# Patient Record
Sex: FEMALE | Race: WHITE | NOT HISPANIC OR LATINO | Employment: OTHER | URBAN - METROPOLITAN AREA
[De-identification: names, ages, dates, MRNs, and addresses within clinical notes are randomized per-mention and may not be internally consistent; named-entity substitution may affect disease eponyms.]

---

## 2020-03-03 ENCOUNTER — NEW PATIENT COMPREHENSIVE (OUTPATIENT)
Dept: URBAN - METROPOLITAN AREA CLINIC 36 | Facility: CLINIC | Age: 80
End: 2020-03-03

## 2020-03-03 DIAGNOSIS — H25.811: ICD-10-CM

## 2020-03-03 DIAGNOSIS — H25.812: ICD-10-CM

## 2020-03-03 DIAGNOSIS — H35.361: ICD-10-CM

## 2020-03-03 DIAGNOSIS — H40.013: ICD-10-CM

## 2020-03-03 PROCEDURE — 92015 DETERMINE REFRACTIVE STATE: CPT

## 2020-03-03 PROCEDURE — 92004 COMPRE OPH EXAM NEW PT 1/>: CPT

## 2020-03-03 ASSESSMENT — VISUAL ACUITY
OD_CC: J2
OD_SC: 20/400
OS_SC: CF 6FT
OD_SC: J12-1
OD_CC: 20/60-2
OS_CC: 20/25
OD_PH: 20/40
OS_CC: J2

## 2020-03-03 ASSESSMENT — TONOMETRY
OS_IOP_MMHG: 19
OD_IOP_MMHG: 19

## 2020-07-22 ENCOUNTER — EST. PATIENT EMERGENCY (OUTPATIENT)
Dept: URBAN - METROPOLITAN AREA CLINIC 39 | Facility: CLINIC | Age: 80
End: 2020-07-22

## 2020-07-22 DIAGNOSIS — H00.012: ICD-10-CM

## 2020-07-22 DIAGNOSIS — H25.812: ICD-10-CM

## 2020-07-22 DIAGNOSIS — H40.013: ICD-10-CM

## 2020-07-22 DIAGNOSIS — H35.363: ICD-10-CM

## 2020-07-22 DIAGNOSIS — H35.3131: ICD-10-CM

## 2020-07-22 DIAGNOSIS — H25.811: ICD-10-CM

## 2020-07-22 DIAGNOSIS — H43.813: ICD-10-CM

## 2020-07-22 PROCEDURE — 92134 CPTRZ OPH DX IMG PST SGM RTA: CPT

## 2020-07-22 PROCEDURE — 92014 COMPRE OPH EXAM EST PT 1/>: CPT

## 2020-07-22 ASSESSMENT — VISUAL ACUITY
OD_CC: 20/70-1
OS_CC: 20/60+2
OS_BAT: 20/200
OD_BAT: 20/200

## 2020-07-22 ASSESSMENT — TONOMETRY
OD_IOP_MMHG: 17
OS_IOP_MMHG: 17

## 2020-08-04 ENCOUNTER — FOLLOW UP (OUTPATIENT)
Dept: URBAN - METROPOLITAN AREA CLINIC 39 | Facility: CLINIC | Age: 80
End: 2020-08-04

## 2020-08-04 DIAGNOSIS — H00.012: ICD-10-CM

## 2020-08-04 DIAGNOSIS — H40.013: ICD-10-CM

## 2020-08-04 DIAGNOSIS — H25.811: ICD-10-CM

## 2020-08-04 DIAGNOSIS — H25.812: ICD-10-CM

## 2020-08-04 PROCEDURE — 92012 INTRM OPH EXAM EST PATIENT: CPT

## 2020-08-04 PROCEDURE — 92083 EXTENDED VISUAL FIELD XM: CPT

## 2020-08-04 ASSESSMENT — VISUAL ACUITY
OD_CC: 20/80
OS_BAT: 20/200
OS_CC: 20/60
OD_BAT: 20/200

## 2020-08-04 ASSESSMENT — TONOMETRY
OD_IOP_MMHG: 14
OS_IOP_MMHG: 14

## 2020-08-11 ENCOUNTER — CATARACT CONSULT (OUTPATIENT)
Dept: URBAN - METROPOLITAN AREA CLINIC 39 | Facility: CLINIC | Age: 80
End: 2020-08-11

## 2020-08-11 DIAGNOSIS — H25.811: ICD-10-CM

## 2020-08-11 DIAGNOSIS — H25.812: ICD-10-CM

## 2020-08-11 DIAGNOSIS — H43.813: ICD-10-CM

## 2020-08-11 DIAGNOSIS — H40.013: ICD-10-CM

## 2020-08-11 DIAGNOSIS — H35.363: ICD-10-CM

## 2020-08-11 DIAGNOSIS — H35.3131: ICD-10-CM

## 2020-08-11 PROCEDURE — 92025-1 CORNEAL TOPOGRAPHY, INS

## 2020-08-11 PROCEDURE — 92286 ANT SGM IMG I&R SPECLR MIC: CPT

## 2020-08-11 PROCEDURE — 92136TC INTERFEROMETRY - TECHNICAL COMPONENT

## 2020-08-11 PROCEDURE — V2799PMN IMPRIMIS PRED-MOXI-NEPAF 5ML

## 2020-08-11 PROCEDURE — 92250 FUNDUS PHOTOGRAPHY W/I&R: CPT

## 2020-08-11 PROCEDURE — 92014 COMPRE OPH EXAM EST PT 1/>: CPT

## 2020-08-11 ASSESSMENT — TONOMETRY
OS_IOP_MMHG: 13
OD_IOP_MMHG: 14

## 2020-08-11 ASSESSMENT — VISUAL ACUITY
OS_CC: J3
OS_SC: 20/400
OS_BAT: 20/200
OD_SC: >J12
OD_BAT: 20/200
OD_SC: 20/200
OD_CC: J1
OD_CC: 20/70-1
OS_CC: 20/60-2
OS_SC: >J12

## 2020-08-24 ENCOUNTER — ESTABLISHED PATIENT (OUTPATIENT)
Dept: URBAN - METROPOLITAN AREA CLINIC 44 | Facility: CLINIC | Age: 80
End: 2020-08-24

## 2020-08-24 DIAGNOSIS — H00.12: ICD-10-CM

## 2020-08-24 DIAGNOSIS — H00.012: ICD-10-CM

## 2020-08-24 PROCEDURE — 92285 EXTERNAL OCULAR PHOTOGRAPHY: CPT

## 2020-08-24 PROCEDURE — 99212 OFFICE O/P EST SF 10 MIN: CPT

## 2020-08-24 RX ORDER — TOBRAMYCIN AND DEXAMETHASONE 1; 3 MG/ML; MG/ML
1 SUSPENSION/ DROPS OPHTHALMIC
End: 2020-09-07

## 2020-08-24 RX ORDER — ERYTHROMYCIN 5 MG/G
OINTMENT OPHTHALMIC
End: 2020-09-07

## 2020-08-24 RX ORDER — DOXYCYCLINE 100 MG/1
1 CAPSULE ORAL
End: 2020-09-07

## 2020-08-24 ASSESSMENT — VISUAL ACUITY
OS_CC: 20/20
OD_CC: 20/20-1

## 2020-10-21 ENCOUNTER — SURGERY/PROCEDURE (OUTPATIENT)
Dept: URBAN - METROPOLITAN AREA CLINIC 39 | Facility: CLINIC | Age: 80
End: 2020-10-21

## 2020-10-21 ENCOUNTER — ESTABLISHED PATIENT (OUTPATIENT)
Dept: URBAN - METROPOLITAN AREA SURGERY 14 | Facility: SURGERY | Age: 80
End: 2020-10-21

## 2020-10-21 DIAGNOSIS — H02.883: ICD-10-CM

## 2020-10-21 DIAGNOSIS — H25.811: ICD-10-CM

## 2020-10-21 DIAGNOSIS — H00.012: ICD-10-CM

## 2020-10-21 DIAGNOSIS — H00.12: ICD-10-CM

## 2020-10-21 DIAGNOSIS — H25.812: ICD-10-CM

## 2020-10-21 DIAGNOSIS — H02.886: ICD-10-CM

## 2020-10-21 PROCEDURE — 66984 XCAPSL CTRC RMVL W/O ECP: CPT

## 2020-10-21 PROCEDURE — 99211HP H&P OFFICE/OUTPATIENT VISIT, EST

## 2020-10-22 ENCOUNTER — CATARACT POST-OP 1-DAY (OUTPATIENT)
Dept: URBAN - METROPOLITAN AREA CLINIC 39 | Facility: CLINIC | Age: 80
End: 2020-10-22

## 2020-10-22 DIAGNOSIS — Z96.1: ICD-10-CM

## 2020-10-22 PROCEDURE — 99024 POSTOP FOLLOW-UP VISIT: CPT

## 2020-10-22 ASSESSMENT — TONOMETRY
OS_IOP_MMHG: 12
OD_IOP_MMHG: 13

## 2020-10-22 ASSESSMENT — VISUAL ACUITY: OS_SC: 20/40-2

## 2020-10-27 ENCOUNTER — POST OP/EVAL OF SECOND EYE (OUTPATIENT)
Dept: URBAN - METROPOLITAN AREA CLINIC 39 | Facility: CLINIC | Age: 80
End: 2020-10-27

## 2020-10-27 DIAGNOSIS — Z96.1: ICD-10-CM

## 2020-10-27 DIAGNOSIS — H25.811: ICD-10-CM

## 2020-10-27 PROCEDURE — 92012 INTRM OPH EXAM EST PATIENT: CPT

## 2020-10-27 ASSESSMENT — VISUAL ACUITY
OD_BAT: 20/200
OS_SC: J10
OS_SC: 20/25-2

## 2020-10-27 ASSESSMENT — TONOMETRY
OS_IOP_MMHG: 16
OD_IOP_MMHG: 14

## 2020-10-28 ENCOUNTER — ESTABLISHED PATIENT (OUTPATIENT)
Dept: URBAN - METROPOLITAN AREA SURGERY 14 | Facility: SURGERY | Age: 80
End: 2020-10-28

## 2020-10-28 ENCOUNTER — SURGERY/PROCEDURE (OUTPATIENT)
Dept: URBAN - METROPOLITAN AREA CLINIC 39 | Facility: CLINIC | Age: 80
End: 2020-10-28

## 2020-10-28 DIAGNOSIS — H04.123: ICD-10-CM

## 2020-10-28 DIAGNOSIS — H35.3131: ICD-10-CM

## 2020-10-28 DIAGNOSIS — H40.013: ICD-10-CM

## 2020-10-28 DIAGNOSIS — H25.811: ICD-10-CM

## 2020-10-28 DIAGNOSIS — H35.363: ICD-10-CM

## 2020-10-28 DIAGNOSIS — H02.88B: ICD-10-CM

## 2020-10-28 DIAGNOSIS — H02.88A: ICD-10-CM

## 2020-10-28 DIAGNOSIS — H00.12: ICD-10-CM

## 2020-10-28 DIAGNOSIS — H26.492: ICD-10-CM

## 2020-10-28 DIAGNOSIS — H43.813: ICD-10-CM

## 2020-10-28 PROCEDURE — 99211HP H&P OFFICE/OUTPATIENT VISIT, EST

## 2020-10-28 PROCEDURE — 66984 XCAPSL CTRC RMVL W/O ECP: CPT

## 2020-10-29 ENCOUNTER — CATARACT POST-OP 1-DAY (OUTPATIENT)
Dept: URBAN - METROPOLITAN AREA CLINIC 39 | Facility: CLINIC | Age: 80
End: 2020-10-29

## 2020-10-29 DIAGNOSIS — Z96.1: ICD-10-CM

## 2020-10-29 PROCEDURE — 99024 POSTOP FOLLOW-UP VISIT: CPT

## 2020-10-29 ASSESSMENT — VISUAL ACUITY
OS_SC: 20/25-1
OD_SC: 20/60-1
OD_PH: 20/40

## 2020-10-29 ASSESSMENT — TONOMETRY
OS_IOP_MMHG: 16
OD_IOP_MMHG: 18

## 2020-11-19 ENCOUNTER — POST-OP CATARACT (OUTPATIENT)
Dept: URBAN - METROPOLITAN AREA CLINIC 39 | Facility: CLINIC | Age: 80
End: 2020-11-19

## 2020-11-19 DIAGNOSIS — Z96.1: ICD-10-CM

## 2020-11-19 PROCEDURE — 99024 POSTOP FOLLOW-UP VISIT: CPT

## 2020-11-19 ASSESSMENT — VISUAL ACUITY
OU_SC: J8
OD_SC: 20/50-2
OS_SC: 20/25-1
OU_SC: 20/25+1
OS_SC: J10
OD_SC: J8

## 2020-11-19 ASSESSMENT — KERATOMETRY
OS_K1POWER_DIOPTERS: 44
OD_K1POWER_DIOPTERS: 43
OS_AXISANGLE2_DEGREES: 10
OD_AXISANGLE2_DEGREES: 157
OD_K2POWER_DIOPTERS: 44
OS_K2POWER_DIOPTERS: 44.75
OS_AXISANGLE_DEGREES: 100
OD_AXISANGLE_DEGREES: 67

## 2020-11-19 ASSESSMENT — TONOMETRY
OD_IOP_MMHG: 14
OS_IOP_MMHG: 12

## 2021-02-04 NOTE — PROCEDURE NOTE: CLINICAL
PROCEDURE NOTE: Punctal Plugs, 0.4 mm Quintess Dissolvable (66312K, ) #1 Right Lower Lid. Prior to treatment, the risks/benefits/alternatives were discussed. The patient wished to proceed with procedure. Temporary collagen plugs were inserted. Patient tolerated procedure well. There were no complications. Post procedure instructions given. Harry Morse PROCEDURE NOTE: Punctal Plugs, 0.4 mm Quintess Dissolvable (54348G, ) #1 Left Lower Lid. Prior to treatment, the risks/benefits/alternatives were discussed. The patient wished to proceed with procedure. Temporary collagen plugs were inserted. Patient tolerated procedure well. There were no complications. Post procedure instructions given. Harry Morse

## 2021-05-21 ENCOUNTER — ESTABLISHED COMPREHENSIVE EXAM (OUTPATIENT)
Dept: URBAN - METROPOLITAN AREA CLINIC 39 | Facility: CLINIC | Age: 81
End: 2021-05-21

## 2021-05-21 DIAGNOSIS — H52.4: ICD-10-CM

## 2021-05-21 DIAGNOSIS — H52.02: ICD-10-CM

## 2021-05-21 DIAGNOSIS — H43.813: ICD-10-CM

## 2021-05-21 DIAGNOSIS — H35.3131: ICD-10-CM

## 2021-05-21 DIAGNOSIS — H26.492: ICD-10-CM

## 2021-05-21 DIAGNOSIS — H02.88B: ICD-10-CM

## 2021-05-21 DIAGNOSIS — H40.013: ICD-10-CM

## 2021-05-21 DIAGNOSIS — H52.203: ICD-10-CM

## 2021-05-21 DIAGNOSIS — H26.491: ICD-10-CM

## 2021-05-21 DIAGNOSIS — H04.123: ICD-10-CM

## 2021-05-21 DIAGNOSIS — H35.363: ICD-10-CM

## 2021-05-21 DIAGNOSIS — H02.88A: ICD-10-CM

## 2021-05-21 PROCEDURE — 92014 COMPRE OPH EXAM EST PT 1/>: CPT

## 2021-05-21 PROCEDURE — 92015 DETERMINE REFRACTIVE STATE: CPT

## 2021-05-21 ASSESSMENT — VISUAL ACUITY
OS_SC: J8
OS_CC: J2
OS_CC: 20/25+2
OU_SC: 20/20-2
OD_SC: 20/50-2
OD_CC: J3
OS_SC: 20/20-2
OU_CC: J1
OD_SC: J10
OD_CC: 20/30-2
OU_CC: 20/20-2
OS_BAT: 20/40
OD_BAT: 20/40

## 2021-05-21 ASSESSMENT — TONOMETRY
OD_IOP_MMHG: 12
OS_IOP_MMHG: 11

## 2021-05-21 ASSESSMENT — KERATOMETRY
OD_K1POWER_DIOPTERS: 43.00
OS_K2POWER_DIOPTERS: 44.50
OS_AXISANGLE_DEGREES: 85
OD_AXISANGLE2_DEGREES: 152
OS_AXISANGLE2_DEGREES: 175
OD_K2POWER_DIOPTERS: 44.25
OS_K1POWER_DIOPTERS: 43.50
OD_AXISANGLE_DEGREES: 62

## 2021-11-17 ENCOUNTER — IOP CHECK (OUTPATIENT)
Dept: URBAN - METROPOLITAN AREA CLINIC 39 | Facility: CLINIC | Age: 81
End: 2021-11-17

## 2021-11-17 DIAGNOSIS — H40.013: ICD-10-CM

## 2021-11-17 PROCEDURE — 92250 FUNDUS PHOTOGRAPHY W/I&R: CPT

## 2021-11-17 PROCEDURE — 92083 EXTENDED VISUAL FIELD XM: CPT

## 2021-11-17 PROCEDURE — 92012 INTRM OPH EXAM EST PATIENT: CPT

## 2021-11-17 ASSESSMENT — VISUAL ACUITY
OD_CC: 20/40
OS_CC: 20/25-1

## 2021-11-17 ASSESSMENT — TONOMETRY
OS_IOP_MMHG: 10
OD_IOP_MMHG: 12

## 2021-11-17 ASSESSMENT — KERATOMETRY
OD_K1POWER_DIOPTERS: 43.00
OS_AXISANGLE_DEGREES: 85
OD_K2POWER_DIOPTERS: 44.25
OD_AXISANGLE_DEGREES: 62
OS_AXISANGLE2_DEGREES: 175
OS_K1POWER_DIOPTERS: 43.50
OS_K2POWER_DIOPTERS: 44.50
OD_AXISANGLE2_DEGREES: 152

## 2022-05-17 ENCOUNTER — COMPREHENSIVE EXAM (OUTPATIENT)
Dept: URBAN - METROPOLITAN AREA CLINIC 39 | Facility: CLINIC | Age: 82
End: 2022-05-17

## 2022-05-17 DIAGNOSIS — H04.123: ICD-10-CM

## 2022-05-17 DIAGNOSIS — H52.203: ICD-10-CM

## 2022-05-17 DIAGNOSIS — H02.88A: ICD-10-CM

## 2022-05-17 DIAGNOSIS — H52.02: ICD-10-CM

## 2022-05-17 DIAGNOSIS — H43.813: ICD-10-CM

## 2022-05-17 DIAGNOSIS — H40.013: ICD-10-CM

## 2022-05-17 DIAGNOSIS — H52.4: ICD-10-CM

## 2022-05-17 DIAGNOSIS — H26.493: ICD-10-CM

## 2022-05-17 DIAGNOSIS — H35.3131: ICD-10-CM

## 2022-05-17 DIAGNOSIS — H02.88B: ICD-10-CM

## 2022-05-17 DIAGNOSIS — H35.363: ICD-10-CM

## 2022-05-17 PROCEDURE — 92014 COMPRE OPH EXAM EST PT 1/>: CPT

## 2022-05-17 PROCEDURE — 92015 DETERMINE REFRACTIVE STATE: CPT

## 2022-05-17 PROCEDURE — 92133 CPTRZD OPH DX IMG PST SGM ON: CPT

## 2022-05-17 ASSESSMENT — KERATOMETRY
OS_AXISANGLE2_DEGREES: 175
OD_AXISANGLE_DEGREES: 62
OD_K2POWER_DIOPTERS: 44.25
OS_K1POWER_DIOPTERS: 43.50
OD_K1POWER_DIOPTERS: 43.00
OS_AXISANGLE_DEGREES: 85
OD_AXISANGLE2_DEGREES: 152
OS_K2POWER_DIOPTERS: 44.50

## 2022-05-17 ASSESSMENT — VISUAL ACUITY
OS_CC: J3
OS_SC: J8
OD_CC: 20/40+2
OD_SC: J8
OS_CC: 20/25
OD_CC: J5
OS_SC: 20/50-1
OD_SC: 20/70-1

## 2022-05-17 ASSESSMENT — TONOMETRY
OD_IOP_MMHG: 12
OS_IOP_MMHG: 12

## 2023-09-14 ENCOUNTER — COMPREHENSIVE EXAM (OUTPATIENT)
Dept: URBAN - METROPOLITAN AREA CLINIC 39 | Facility: CLINIC | Age: 83
End: 2023-09-14

## 2023-09-14 DIAGNOSIS — H43.813: ICD-10-CM

## 2023-09-14 DIAGNOSIS — H52.203: ICD-10-CM

## 2023-09-14 DIAGNOSIS — H35.363: ICD-10-CM

## 2023-09-14 DIAGNOSIS — H26.493: ICD-10-CM

## 2023-09-14 DIAGNOSIS — H40.013: ICD-10-CM

## 2023-09-14 DIAGNOSIS — H52.02: ICD-10-CM

## 2023-09-14 DIAGNOSIS — H35.3131: ICD-10-CM

## 2023-09-14 DIAGNOSIS — H04.123: ICD-10-CM

## 2023-09-14 DIAGNOSIS — H02.88A: ICD-10-CM

## 2023-09-14 DIAGNOSIS — H02.88B: ICD-10-CM

## 2023-09-14 DIAGNOSIS — H52.4: ICD-10-CM

## 2023-09-14 PROCEDURE — 92014 COMPRE OPH EXAM EST PT 1/>: CPT

## 2023-09-14 PROCEDURE — 92083 EXTENDED VISUAL FIELD XM: CPT

## 2023-09-14 PROCEDURE — 92015 DETERMINE REFRACTIVE STATE: CPT

## 2023-09-14 ASSESSMENT — VISUAL ACUITY
OS_SC: 20/40
OD_CC: 20/30
OS_CC: 20/25
OD_SC: 20/40
OD_CC: J5
OS_CC: J5

## 2023-09-14 ASSESSMENT — KERATOMETRY
OS_AXISANGLE_DEGREES: 85
OS_K1POWER_DIOPTERS: 43.50
OS_AXISANGLE2_DEGREES: 175
OD_AXISANGLE_DEGREES: 62
OD_K2POWER_DIOPTERS: 44.25
OS_K2POWER_DIOPTERS: 44.50
OD_K1POWER_DIOPTERS: 43.00
OD_AXISANGLE2_DEGREES: 152

## 2023-09-14 ASSESSMENT — PACHYMETRY
OS_CT_UM: 525
OD_CT_UM: 528

## 2023-09-14 ASSESSMENT — TONOMETRY
OS_IOP_MMHG: 12
OD_IOP_MMHG: 12

## 2024-11-08 ENCOUNTER — COMPREHENSIVE EXAM (OUTPATIENT)
Dept: URBAN - METROPOLITAN AREA CLINIC 39 | Facility: CLINIC | Age: 84
End: 2024-11-08

## 2024-11-08 DIAGNOSIS — H02.88A: ICD-10-CM

## 2024-11-08 DIAGNOSIS — H35.3131: ICD-10-CM

## 2024-11-08 DIAGNOSIS — H40.013: ICD-10-CM

## 2024-11-08 DIAGNOSIS — H43.813: ICD-10-CM

## 2024-11-08 DIAGNOSIS — H52.02: ICD-10-CM

## 2024-11-08 DIAGNOSIS — H52.203: ICD-10-CM

## 2024-11-08 DIAGNOSIS — H52.4: ICD-10-CM

## 2024-11-08 DIAGNOSIS — H26.493: ICD-10-CM

## 2024-11-08 DIAGNOSIS — H02.88B: ICD-10-CM

## 2024-11-08 DIAGNOSIS — H04.123: ICD-10-CM

## 2024-11-08 DIAGNOSIS — H35.363: ICD-10-CM

## 2024-11-08 PROCEDURE — 92015 DETERMINE REFRACTIVE STATE: CPT

## 2024-11-08 PROCEDURE — 99214 OFFICE O/P EST MOD 30 MIN: CPT

## 2025-01-13 ENCOUNTER — SURGERY/PROCEDURE (OUTPATIENT)
Age: 85
End: 2025-01-13

## 2025-01-13 ENCOUNTER — CONSULTATION/EVALUATION (OUTPATIENT)
Age: 85
End: 2025-01-13

## 2025-01-13 DIAGNOSIS — H26.493: ICD-10-CM

## 2025-01-13 PROCEDURE — 6682150 YAG CAPSULOTOMY: Mod: 50

## 2025-01-13 PROCEDURE — 99214 OFFICE O/P EST MOD 30 MIN: CPT

## 2025-01-21 ENCOUNTER — POST-OP (OUTPATIENT)
Age: 85
End: 2025-01-21

## 2025-01-21 DIAGNOSIS — Z98.890: ICD-10-CM

## 2025-01-21 PROCEDURE — 99024 POSTOP FOLLOW-UP VISIT: CPT
